# Patient Record
Sex: FEMALE | Race: OTHER | HISPANIC OR LATINO | URBAN - METROPOLITAN AREA
[De-identification: names, ages, dates, MRNs, and addresses within clinical notes are randomized per-mention and may not be internally consistent; named-entity substitution may affect disease eponyms.]

---

## 2022-09-17 ENCOUNTER — EMERGENCY (EMERGENCY)
Facility: HOSPITAL | Age: 37
LOS: 1 days | Discharge: HOME | End: 2022-09-17
Attending: INTERNAL MEDICINE | Admitting: INTERNAL MEDICINE
Payer: COMMERCIAL

## 2022-09-17 VITALS
RESPIRATION RATE: 16 BRPM | HEART RATE: 89 BPM | OXYGEN SATURATION: 100 % | DIASTOLIC BLOOD PRESSURE: 62 MMHG | TEMPERATURE: 98 F | SYSTOLIC BLOOD PRESSURE: 101 MMHG

## 2022-09-17 VITALS
HEART RATE: 100 BPM | HEIGHT: 63 IN | TEMPERATURE: 98 F | DIASTOLIC BLOOD PRESSURE: 61 MMHG | OXYGEN SATURATION: 100 % | SYSTOLIC BLOOD PRESSURE: 97 MMHG | WEIGHT: 110.01 LBS | RESPIRATION RATE: 16 BRPM

## 2022-09-17 PROCEDURE — 12001 RPR S/N/AX/GEN/TRNK 2.5CM/<: CPT

## 2022-09-17 PROCEDURE — 90471 IMMUNIZATION ADMIN: CPT

## 2022-09-17 PROCEDURE — 90715 TDAP VACCINE 7 YRS/> IM: CPT

## 2022-09-17 PROCEDURE — 99283 EMERGENCY DEPT VISIT LOW MDM: CPT | Mod: 25

## 2022-09-17 PROCEDURE — 99284 EMERGENCY DEPT VISIT MOD MDM: CPT | Mod: 25

## 2022-09-17 PROCEDURE — 73120 X-RAY EXAM OF HAND: CPT | Mod: 26,RT

## 2022-09-17 PROCEDURE — 73120 X-RAY EXAM OF HAND: CPT

## 2022-09-17 RX ORDER — CEPHALEXIN 500 MG
1 CAPSULE ORAL
Qty: 14 | Refills: 0
Start: 2022-09-17 | End: 2022-09-23

## 2022-09-17 RX ORDER — CEPHALEXIN 500 MG
500 CAPSULE ORAL ONCE
Refills: 0 | Status: COMPLETED | OUTPATIENT
Start: 2022-09-17 | End: 2022-09-17

## 2022-09-17 RX ORDER — TETANUS TOXOID, REDUCED DIPHTHERIA TOXOID AND ACELLULAR PERTUSSIS VACCINE, ADSORBED 5; 2.5; 8; 8; 2.5 [IU]/.5ML; [IU]/.5ML; UG/.5ML; UG/.5ML; UG/.5ML
0.5 SUSPENSION INTRAMUSCULAR ONCE
Refills: 0 | Status: COMPLETED | OUTPATIENT
Start: 2022-09-17 | End: 2022-09-17

## 2022-09-17 RX ADMIN — Medication 500 MILLIGRAM(S): at 06:28

## 2022-09-17 RX ADMIN — TETANUS TOXOID, REDUCED DIPHTHERIA TOXOID AND ACELLULAR PERTUSSIS VACCINE, ADSORBED 0.5 MILLILITER(S): 5; 2.5; 8; 8; 2.5 SUSPENSION INTRAMUSCULAR at 06:24

## 2022-09-17 NOTE — ED PROVIDER NOTE - NSFOLLOWUPINSTRUCTIONS_ED_ALL_ED_FT
Monitoring on Telemetry   Serial EKG  IVF  Psych consult;  1:1 take antibiotic Keflex 500mg twice daily   follow up with hand surgeon on Monday, call office for an appointment     After having your stitches or staples removed it is typical to have minor discomfort, swelling, or redness in the area. The wound is still healing so continue to protect it from injury. Keep the wound dry and if given creams, ointments, or medication, take as instructed to by your health care professional.    SEEK IMMEDIATE MEDICAL CARE IF YOU HAVE ANY OF THE FOLLOWING SYMPTOMS: increasing redness/swelling/pain in the wound, pus coming from the wound, bad smell coming from the wound, or fever.

## 2022-09-17 NOTE — ED PROVIDER NOTE - CLINICAL SUMMARY MEDICAL DECISION MAKING FREE TEXT BOX
hand laceration , xray no FB, wound repair,  Rx AB and referred to hand surgeon on call for O/P follow up

## 2022-09-17 NOTE — ED PROVIDER NOTE - CARE PROVIDER_API CALL
Myron Mejia)  Plastic Surgery; Surgery; Surgical Critical Care  [address] [place] [address]  Phone: [phone]  Fax: [phone]  Follow Up Time: 1-3 Days

## 2022-09-17 NOTE — ED PROVIDER NOTE - PATIENT PORTAL LINK FT
You can access the FollowMyHealth Patient Portal offered by [facility] by registering at the following website: [email or web address]. By joining [facility]’s FollowMyHealth portal, you will also be able to view your health information using other applications (apps) compatible with our system.

## 2022-09-17 NOTE — ED ADULT NURSE NOTE - OBJECTIVE STATEMENT
Patient reports she cut her fingers 3rd, 4th and 5th right fingers while opening a bottle of wine noted with bleeding on the area. Seen by Dr. Cherry will suture the wound.

## 2022-09-17 NOTE — ED PROVIDER NOTE - MUSCULOSKELETAL, MLM
Spine appears normal, range of motion is not limited, no muscle or joint tenderness lacerations to the right  3 and 4 fingers, 5th finger abrasion , tendon function normal, neuro vacsular exam normal, repair , procedure note

## 2022-09-17 NOTE — ED PROVIDER NOTE - OBJECTIVE STATEMENT
lacerations to right fingers 3, 4 and 5  hand pain/injury 35 y/o female with lacerations to right  3 and 4 fingers  and abrasion of the 5th finger, cut on a broken bottle   no weakness no sensory changes

## 2024-07-01 NOTE — ED ADULT NURSE NOTE - CHIEF COMPLAINT QUOTE
Rx Refill Note  Requested Prescriptions     Pending Prescriptions Disp Refills    promethazine (PHENERGAN) 12.5 MG tablet [Pharmacy Med Name: PROMETHAZINE 12.5 MG TABLET] 30 tablet 0     Sig: TAKE 1 TABLET BY MOUTH EVERY 8 HOURS AS NEEDED FOR NAUSEA AND/OR VOMITING      Last office visit with prescribing clinician: 5/3/2024   Last telemedicine visit with prescribing clinician: Visit date not found   Next office visit with prescribing   Alexia Martinez MA  07/01/24, 10:28 EDT  
lacerations to right fingers 3, 4 and 5